# Patient Record
(demographics unavailable — no encounter records)

---

## 2024-12-30 NOTE — DISCUSSION/SUMMARY
[de-identified] : Chief complaint Neck pain back pain  HPI Patient is a very pleasant 20-year-old 5-year history of neck and back issues.  The patient was admitted for psychiatric issues last year had a CT scan of his neck.  He is a chronic neck pain back pain coming down to his back and his left leg.  There is numbness and tingling to his ankle.  He does heavy weightlifting.  He has been doing physical therapy, chiropractic care.  He continues to physical therapy with minimal relief.  Has been diagnosed with TMJ.  He also has some GI upset.  He reports having some tick bites but never been diagnosed with Lyme disease.  He currently lives in [place].  No fevers or chills.  No issues with gait or balance bowel bladder incontinence  Examination Pt is NAD, AAOX3 skin is intact no palpable stepoff 5/5 motor strength BUE SILT C5-T1 BUE POSITIVE SPURLING Negative lu normal rapid  test equal reflexes bicep/BR/tricep hand wwp bcr  Patient is NAD, AAOX3 skin is intact, NTTP in LS SPINE mild pain with ROM 5/5 motor strength in BLE SILT L1-S1 BLE POSITIVE straight leg raise Negative flavio equal patella/achilles reflex normal gait  Imaging X-ray cervical spine demonstrate mild loss of disc at C5-C6.  CT scan from January 2023 demonstrates small disc bulge at C5-C6.  There is no fracture.  X-rays of the elbow and knee and hand demonstrate no obvious fracture.  There is a remote history of a right fifth metacarpal fracture.  Treatment note I discussed at length with the patient nature of the condition.  The patient presents with neck pain back pain left leg pain for 5 years.  He has done physical therapy chiropractic care.  This time we will get an MRI to better evaluate his problems.  He will continue with the exercises.  If the MRIs are benign, we will recommend an autoimmune workup.  All the patient's questions were sought and answered